# Patient Record
(demographics unavailable — no encounter records)

---

## 2025-07-12 NOTE — HISTORY OF PRESENT ILLNESS
[de-identified] : 7/12/2025- no known in jury, for about 2 months pt started to have lower back pain which radiates down to her right leg, states she sometimes feels numbness and tingling. pain is worse whenever she wears flat shoes. hx of scoliosis Pt denies associated weakness or b/b dysfunction. There is no hx of trauma.   PMH: denied Allergies: NKDA

## 2025-07-12 NOTE — ASSESSMENT
[FreeTextEntry1] : The patient was advised of the diagnosis. The natural history of the pathology was explained in full to the patient in layman's terms. All questions were answered. The risks and benefits of surgical and non-surgical treatment alternatives were explained in full to the patient.  provided medrol dose pack rx provided Flexeril 5 mg tid rx provided PT rx.  rto in 2 weeks for f/u care.   will consider MRI of the lumbar spine if no improvement noted in 6 weeks.

## 2025-07-12 NOTE — IMAGING
[de-identified] : Thoracic / Lumbar spine visually: no changes Gait examination: wnls Pt is able to heel and toe walk. Shopping Cart Sign: neg TTP: right paralumbar ttp / no spasm Percussion of T/L spine: negative ROM: mildly limited in flexion Facet Joint Compression Tests: neg Sacroiliac joints: nttp Bilateral hip ROM: full and pain free Sensation: wnls DTRs: guarding Clonus: none Babinski Test: negative Seated SLR Test: negative Contralateral SLR test: negative CHRISTINA Test: negative Femoral Stretch Testing: negative  Lumbar spine 2 view xray: no acute bony pathology pelvic xray: no acute bony pathology

## 2025-07-12 NOTE — IMAGING
[de-identified] : Thoracic / Lumbar spine visually: no changes Gait examination: wnls Pt is able to heel and toe walk. Shopping Cart Sign: neg TTP: right paralumbar ttp / no spasm Percussion of T/L spine: negative ROM: mildly limited in flexion Facet Joint Compression Tests: neg Sacroiliac joints: nttp Bilateral hip ROM: full and pain free Sensation: wnls DTRs: guarding Clonus: none Babinski Test: negative Seated SLR Test: negative Contralateral SLR test: negative CHRISTINA Test: negative Femoral Stretch Testing: negative  Lumbar spine 2 view xray: no acute bony pathology pelvic xray: no acute bony pathology

## 2025-07-12 NOTE — HISTORY OF PRESENT ILLNESS
[de-identified] : 7/12/2025- no known in jury, for about 2 months pt started to have lower back pain which radiates down to her right leg, states she sometimes feels numbness and tingling. pain is worse whenever she wears flat shoes. hx of scoliosis Pt denies associated weakness or b/b dysfunction. There is no hx of trauma.   PMH: denied Allergies: NKDA